# Patient Record
Sex: MALE | Race: WHITE | NOT HISPANIC OR LATINO | Employment: FULL TIME | URBAN - NONMETROPOLITAN AREA
[De-identification: names, ages, dates, MRNs, and addresses within clinical notes are randomized per-mention and may not be internally consistent; named-entity substitution may affect disease eponyms.]

---

## 2017-03-31 ENCOUNTER — TRANSCRIBE ORDERS (OUTPATIENT)
Dept: GENERAL RADIOLOGY | Facility: HOSPITAL | Age: 57
End: 2017-03-31

## 2017-03-31 ENCOUNTER — HOSPITAL ENCOUNTER (OUTPATIENT)
Dept: GENERAL RADIOLOGY | Facility: HOSPITAL | Age: 57
Discharge: HOME | End: 2017-03-31
Admitting: INTERNAL MEDICINE

## 2017-03-31 DIAGNOSIS — D03.59 MELANOMA IN SITU OF BACK (HCC): Primary | ICD-10-CM

## 2017-03-31 DIAGNOSIS — D03.59 MELANOMA IN SITU OF BACK (HCC): ICD-10-CM

## 2017-03-31 PROCEDURE — 71020 HC CHEST PA AND LATERAL: CPT

## 2018-01-08 ENCOUNTER — TRANSCRIBE ORDERS (OUTPATIENT)
Dept: PHYSICAL THERAPY | Facility: CLINIC | Age: 58
End: 2018-01-08

## 2018-01-08 DIAGNOSIS — M24.572 EQUINUS CONTRACTURE OF LEFT ANKLE: Primary | ICD-10-CM

## 2018-01-11 ENCOUNTER — TREATMENT (OUTPATIENT)
Dept: PHYSICAL THERAPY | Facility: CLINIC | Age: 58
End: 2018-01-11

## 2018-01-11 DIAGNOSIS — M25.572 ACUTE LEFT ANKLE PAIN: Primary | ICD-10-CM

## 2018-01-11 PROCEDURE — 97161 PT EVAL LOW COMPLEX 20 MIN: CPT | Performed by: PHYSICAL THERAPIST

## 2018-01-11 PROCEDURE — 97140 MANUAL THERAPY 1/> REGIONS: CPT | Performed by: PHYSICAL THERAPIST

## 2018-01-11 PROCEDURE — 97110 THERAPEUTIC EXERCISES: CPT | Performed by: PHYSICAL THERAPIST

## 2018-01-11 NOTE — PROGRESS NOTES
Physical Therapy Initial Evaluation and Plan of Care      Patient: Nic Napoles   : 1960  Diagnosis/ICD-10 Code:  No primary diagnosis found.  Referring practitioner: Aníbal Dempsey DPM    Subjective Evaluation    History of Present Illness  Mechanism of injury: L foot pain began on the top of the foot for several months.   He has noted his L foot slapping more when walking.  His flexibility also is limited.     Pain has subsided since getting Cortizone injection in the top of the foot.       Patient Occupation: work clerical work.  Pain  Current pain ratin  Location: Top of the L foot.   Quality: burning  Relieving factors: medications (injection)             Objective       Observations   Left Ankle/Foot   Positive for atrophy.     Palpation     Additional Palpation Details  Tendons of the L foot are tender and guarded.      Active Range of Motion   Left Ankle/Foot   Dorsiflexion (ke): 1 degrees     Right Ankle/Foot   Dorsiflexion (ke): 13 degrees     Additional Active Range of Motion Details  Toes are resting at a 35 degree position of extension.    Passive Range of Motion   Left Ankle/Foot    Dorsiflexion (ke): 10 degrees     Additional Passive Range of Motion Details  Toe flexion is moderately limited L greater than R LE.     Joint Play   Left Ankle/Foot  Hypomobile in the midfoot and forefoot.     Right Ankle/Foot  Hypomobile in the midfoot and forefoot.     Strength/Myotome Testing     Left Ankle/Foot   Dorsiflexion: 3- (ROM limitations as well as strength)         Assessment & Plan     Assessment  Impairments: abnormal gait, abnormal muscle firing, abnormal or restricted ROM, activity intolerance, impaired physical strength, lacks appropriate home exercise program, pain with function and weight-bearing intolerance  Assessment details: Patient is a 57 year old male who comes to physical therapy with L foot pain and foot drop. Signs and symptoms are consistent with L ankle tightness that  is limiting motion and creating weakness and foot drop.  The patient currently has pain, decreased ROM, decreased strength, and inability to perform all essential functional activities. Pt will benefit from skilled PT services to address the above issues.     Prognosis: good  Prognosis details: SHORT TERM GOALS:     2 weeks  1. Pt independent with HEP   2. Pt to demonstrate ability to ambulate in the clinic without walking boot and with no reports of increased pain  3. Pt to demonstrate ability to ambulate in the clinic without antalgic gait   3. Pt to demonstrate ability to perform single leg heel raise on the left without increase in pain      LONG TERM GOALS:   6 weeks  1. Pt to demonstrate ability to perform full functional squat with good form and no increase in pain in the left foot/ankle  2. Pt to demonstrate ability to ambulate on TM for 10 minutes with normal gait pattern without increase in pain  3. Pt to report being able to work full shift without increase in pain in the left ankle/foot  4. Pt to demonstrate ability to perform SLS on the left lower extremity for 30 seconds without LOB or increased pain     Functional Limitations: walking and unable to perform repetitive tasks  Plan  Therapy options: will be seen for skilled physical therapy services  Planned modality interventions: cryotherapy, contrast bath immersion and ultrasound  Planned therapy interventions: stretching, strengthening, soft tissue mobilization, therapeutic activities, joint mobilization, home exercise program, gait training, functional ROM exercises, flexibility, body mechanics training, balance/weight-bearing training, ADL retraining and manual therapy  Frequency: 2x week  Duration in weeks: 8  Treatment plan discussed with: patient        Manual Therapy:    10     mins  60279;  Therapeutic Exercise:    14     mins  85780;     Neuromuscular Natalie:        mins  65984;    Therapeutic Activity:          mins  23351;     Gait Training:            mins  38400;     Ultrasound:          mins  29114;    Electrical Stimulation:         mins  30837 ( );  Dry Needling          mins self-pay    Timed Treatment:   24   mins   Total Treatment:     56   mins    PT SIGNATURE: Adryan Garnett, PT   DATE TREATMENT INITIATED: 1/11/2018    Initial Certification  Certification Period: 4/11/2018  I certify that the therapy services are furnished while this patient is under my care.  The services outlined above are required by this patient, and will be reviewed every 90 days.     PHYSICIAN: Aníbal Dempsey, ARNOLD      DATE:     Please sign and return via fax to  .. Thank you, [facility].

## 2018-01-15 ENCOUNTER — TREATMENT (OUTPATIENT)
Dept: PHYSICAL THERAPY | Facility: CLINIC | Age: 58
End: 2018-01-15

## 2018-01-15 DIAGNOSIS — M25.572 ACUTE LEFT ANKLE PAIN: Primary | ICD-10-CM

## 2018-01-15 PROCEDURE — 97140 MANUAL THERAPY 1/> REGIONS: CPT | Performed by: PHYSICAL THERAPIST

## 2018-01-15 PROCEDURE — 97530 THERAPEUTIC ACTIVITIES: CPT | Performed by: PHYSICAL THERAPIST

## 2018-01-15 PROCEDURE — 97110 THERAPEUTIC EXERCISES: CPT | Performed by: PHYSICAL THERAPIST

## 2018-01-15 NOTE — PROGRESS NOTES
Physical Therapy Daily Progress Note      Visit # : 2    Nic Napoles reports 0/10 pain today at rest.  Occasional sharp pain noted in the foot. Pt reports he is feeling better.          Objective Pt present to PT today with less antalgia noted with ambulation.     Pt with less pain noted with PROM of the foot and toes.       See Exercise, Manual, and Modality Logs for complete treatment.     Assessment/Plan  Pt with increased function and less pain overall. His DF is still limited.       Progress per Plan of Care  Add more hip strengthening for weakness in his hips.  Check response to tape of the foot.            Manual Therapy:    12     mins  24036;  Therapeutic Exercise:    34     mins  53220;     Neuromuscular Natalie:        mins  75698;    Therapeutic Activity:     8     mins  46024;     Gait Training:        ___  mins  37830;     Ultrasound:          mins  91673;    Electrical Stimulation:         mins  99984 ( );  Dry Needling          mins self-pay    Timed Treatment: 54     mins   Total Treatment:     58   mins    Adryan Garnett, PT  Physical Therapist

## 2018-01-19 ENCOUNTER — TREATMENT (OUTPATIENT)
Dept: PHYSICAL THERAPY | Facility: CLINIC | Age: 58
End: 2018-01-19

## 2018-01-19 DIAGNOSIS — M25.572 ACUTE LEFT ANKLE PAIN: Primary | ICD-10-CM

## 2018-01-19 PROCEDURE — 97110 THERAPEUTIC EXERCISES: CPT | Performed by: PHYSICAL THERAPIST

## 2018-01-19 PROCEDURE — 97140 MANUAL THERAPY 1/> REGIONS: CPT | Performed by: PHYSICAL THERAPIST

## 2018-01-19 NOTE — PROGRESS NOTES
Physical Therapy Daily Progress Note      Visit # : 3    Nic Napoles reports 0/10 pain today at rest.  Pt reports some pain in the top of the foot periodically through the day.         Objective Pt present to PT today with no distress.  Pt with much better DF through gait cycle. He still has shortened gait cycle and limited mechanics.     Hip weakness on the R LE is limited gait cycle as well.       See Exercise, Manual, and Modality Logs for complete treatment.     Assessment/Plan  Pt has made great progress with foot ROM and strength.       Progress per Plan of Care             Manual Therapy:    24     mins  98473;  Therapeutic Exercise:    30     mins  65706;     Neuromuscular Natalie:        mins  54727;    Therapeutic Activity:          mins  23937;     Gait Training:        ___  mins  94900;     Ultrasound:          mins  40949;    Electrical Stimulation:         mins  11264 ( );  Dry Needling          mins self-pay    Timed Treatment:   54   mins   Total Treatment:     56   mins    Adryan Garnett, PT  Physical Therapist

## 2018-01-22 ENCOUNTER — TREATMENT (OUTPATIENT)
Dept: PHYSICAL THERAPY | Facility: CLINIC | Age: 58
End: 2018-01-22

## 2018-01-22 DIAGNOSIS — M25.572 ACUTE LEFT ANKLE PAIN: Primary | ICD-10-CM

## 2018-01-22 PROCEDURE — 97140 MANUAL THERAPY 1/> REGIONS: CPT | Performed by: PHYSICAL THERAPIST

## 2018-01-22 PROCEDURE — 97112 NEUROMUSCULAR REEDUCATION: CPT | Performed by: PHYSICAL THERAPIST

## 2018-01-22 PROCEDURE — 97110 THERAPEUTIC EXERCISES: CPT | Performed by: PHYSICAL THERAPIST

## 2018-01-22 NOTE — PROGRESS NOTES
Physical Therapy Daily Progress Note      Visit # : 4    Nic Napoles reports 0/10 pain today at rest.  Pt reports stiffness in the ankle and foot.          Objective Pt present to PT today with no distress noted.     L ankle DF is still limited in ROM and strength.        See Exercise, Manual, and Modality Logs for complete treatment.     Assessment/Plan  Pt with improved overall status.       Progress per Plan of Care             Manual Therapy:    14     mins  40277;  Therapeutic Exercise:    28     mins  65101;     Neuromuscular Natalie:    14    mins  64733;    Therapeutic Activity:          mins  64147;     Gait Training:        ___  mins  40535;     Ultrasound:          mins  09107;    Electrical Stimulation:         mins  41786 ( );  Dry Needling          mins self-pay    Timed Treatment:   56   mins   Total Treatment:     62   mins    Adryan Garnett, PT  Physical Therapist

## 2018-01-26 ENCOUNTER — TREATMENT (OUTPATIENT)
Dept: PHYSICAL THERAPY | Facility: CLINIC | Age: 58
End: 2018-01-26

## 2018-01-26 DIAGNOSIS — M25.572 ACUTE LEFT ANKLE PAIN: Primary | ICD-10-CM

## 2018-01-26 PROCEDURE — 97110 THERAPEUTIC EXERCISES: CPT | Performed by: PHYSICAL THERAPIST

## 2018-01-26 PROCEDURE — 97140 MANUAL THERAPY 1/> REGIONS: CPT | Performed by: PHYSICAL THERAPIST

## 2018-01-26 PROCEDURE — 97112 NEUROMUSCULAR REEDUCATION: CPT | Performed by: PHYSICAL THERAPIST

## 2018-01-26 NOTE — PROGRESS NOTES
Physical Therapy Daily Progress Note      Visit # : 5    Nic Napoles reports 0/10 pain today at rest.  Pt reports he feels like he is doing better with more ankle motion during gait.         Objective Pt present to PT today with no distress at rest.      R Ankle DF is still slightly limited in ROM and moderately limited in Strength.    ambulation without shoe on is more normal without foot slap noted.       See Exercise, Manual, and Modality Logs for complete treatment.     Assessment/Plan  Pt is gaining ROM and is pain free.  His strength in DF is still limited.       Progress strengthening /stabilization /functional activity             Manual Therapy:    14     mins  79556;  Therapeutic Exercise:    31     mins  02784;     Neuromuscular Natalie:  12      mins  77715;    Therapeutic Activity:          mins  66852;     Gait Training:        ___  mins  91284;     Ultrasound:          mins  54623;    Electrical Stimulation:         mins  52421 ( );  Dry Needling          mins self-pay    Timed Treatment:   57   mins   Total Treatment:     61   mins    Adryan Garnett, PT  Physical Therapist

## 2018-01-29 ENCOUNTER — TREATMENT (OUTPATIENT)
Dept: PHYSICAL THERAPY | Facility: CLINIC | Age: 58
End: 2018-01-29

## 2018-01-29 DIAGNOSIS — M25.572 ACUTE LEFT ANKLE PAIN: Primary | ICD-10-CM

## 2018-01-29 PROCEDURE — 97110 THERAPEUTIC EXERCISES: CPT | Performed by: PHYSICAL THERAPIST

## 2018-01-29 PROCEDURE — 97140 MANUAL THERAPY 1/> REGIONS: CPT | Performed by: PHYSICAL THERAPIST

## 2018-01-29 NOTE — PROGRESS NOTES
Physical Therapy Daily Progress Note      Visit # : 6    Nic Napoles reports 0/10 pain today at rest.  Pt with no pain.         Objective Pt present to PT today with no distress noted.    Pt with DF strength slightly elevated.        See Exercise, Manual, and Modality Logs for complete treatment.     Assessment/Plan  Pt with ankle DF strength improved.       Progress per Plan of Care             Manual Therapy:    12     mins  16000;  Therapeutic Exercise:    42     mins  47026;     Neuromuscular Natalie:        mins  46598;    Therapeutic Activity:          mins  33839;     Gait Training:        ___  mins  43582;     Ultrasound:          mins  69833;    Electrical Stimulation:         mins  79349 ( );  Dry Needling          mins self-pay    Timed Treatment:   54   mins   Total Treatment:     59   mins    Adryan Garnett, PT  Physical Therapist

## 2018-01-31 ENCOUNTER — TREATMENT (OUTPATIENT)
Dept: PHYSICAL THERAPY | Facility: CLINIC | Age: 58
End: 2018-01-31

## 2018-01-31 DIAGNOSIS — M25.572 ACUTE LEFT ANKLE PAIN: Primary | ICD-10-CM

## 2018-01-31 PROCEDURE — 97530 THERAPEUTIC ACTIVITIES: CPT | Performed by: PHYSICAL THERAPIST

## 2018-01-31 PROCEDURE — 97110 THERAPEUTIC EXERCISES: CPT | Performed by: PHYSICAL THERAPIST

## 2018-02-05 ENCOUNTER — TREATMENT (OUTPATIENT)
Dept: PHYSICAL THERAPY | Facility: CLINIC | Age: 58
End: 2018-02-05

## 2018-02-05 DIAGNOSIS — M25.572 ACUTE LEFT ANKLE PAIN: Primary | ICD-10-CM

## 2018-02-05 PROCEDURE — 97110 THERAPEUTIC EXERCISES: CPT | Performed by: PHYSICAL THERAPIST

## 2018-02-05 PROCEDURE — 97140 MANUAL THERAPY 1/> REGIONS: CPT | Performed by: PHYSICAL THERAPIST

## 2018-02-05 NOTE — PROGRESS NOTES
Physical Therapy Daily Progress Note      Visit # : 8    Nic Napoles reports 0/10 pain today at rest.  Pt reports he feels like he is getting better.         Objective Pt present to PT today with no distress noted.     Pt ambulating without any pain.      PROM is pain free in the ankle and foot.       See Exercise, Manual, and Modality Logs for complete treatment.     Assessment/Plan  Pt with no pain currently.  Pt is still developing strength in the ankle and the hip.        Progress per Plan of Care  See pt for 2 weeks then reassess.            Manual Therapy:    12     mins  52727;  Therapeutic Exercise:    42     mins  37637;     Neuromuscular Natalie:        mins  44853;    Therapeutic Activity:          mins  79582;     Gait Training:        ___  mins  99550;     Ultrasound:          mins  53318;    Electrical Stimulation:         mins  48069 ( );  Dry Needling          mins self-pay    Timed Treatment:   54   mins   Total Treatment:     59   mins    Adryan Garnett, PT  Physical Therapist

## 2018-02-09 ENCOUNTER — TREATMENT (OUTPATIENT)
Dept: PHYSICAL THERAPY | Facility: CLINIC | Age: 58
End: 2018-02-09

## 2018-02-09 DIAGNOSIS — M25.572 ACUTE LEFT ANKLE PAIN: Primary | ICD-10-CM

## 2018-02-09 PROCEDURE — 97140 MANUAL THERAPY 1/> REGIONS: CPT | Performed by: PHYSICAL THERAPIST

## 2018-02-09 PROCEDURE — 97110 THERAPEUTIC EXERCISES: CPT | Performed by: PHYSICAL THERAPIST

## 2018-02-09 NOTE — PROGRESS NOTES
Physical Therapy Daily Progress Note      Visit # : 9    Nic Napoles reports 0/10 pain today at rest.  Pt reports he is feeling stronger.  He has walked 2 nights this week.  He walked about 2 miles.  He still feels like his foot slaps with prolonged walking.         Objective Pt present to PT today with no distress noted.     Pt with less gait deficit noted.      See Exercise, Manual, and Modality Logs for complete treatment.     Assessment/Plan  Pt with slow progress with strength in the hips and ankle.  He is pain free in the foot.       Progress per Plan of Care               Manual Therapy:    16     mins  10954;  Therapeutic Exercise:    42     mins  56246;     Neuromuscular Natalie:        mins  07226;    Therapeutic Activity:          mins  87461;     Gait Training:        ___  mins  81030;     Ultrasound:          mins  75843;    Electrical Stimulation:         mins  57725 ( );  Dry Needling          mins self-pay    Timed Treatment:   58   mins   Total Treatment:     61   mins    Adryan Garnett, PT  Physical Therapist

## 2018-02-12 ENCOUNTER — TREATMENT (OUTPATIENT)
Dept: PHYSICAL THERAPY | Facility: CLINIC | Age: 58
End: 2018-02-12

## 2018-02-12 DIAGNOSIS — M25.572 ACUTE LEFT ANKLE PAIN: Primary | ICD-10-CM

## 2018-02-12 PROCEDURE — 97110 THERAPEUTIC EXERCISES: CPT | Performed by: PHYSICAL THERAPIST

## 2018-02-12 PROCEDURE — 97140 MANUAL THERAPY 1/> REGIONS: CPT | Performed by: PHYSICAL THERAPIST

## 2018-02-12 NOTE — PROGRESS NOTES
Physical Therapy Daily Progress Note      Visit # : 10    Nic Napoles reports 0/10 pain today at rest.  Pt reports stiffness in the foot.  No pain noted since last visit.         Objective Pt present to PT today with no distress noted.      Pt still needs VC's for correct posture and mechanics for HEP.     MMT:  L ankle DF 3/5,  Inversion 3-/5,  Eversion 4+/5      See Exercise, Manual, and Modality Logs for complete treatment.     Assessment/Plan  Pt's ankle strength is still limited.  He may benefit from nerve testing.       Progress per Plan of Care  Finalized HEP.  Refer back to MD for nerve testing.            Manual Therapy:    13     mins  89344;  Therapeutic Exercise:    42     mins  30338;     Neuromuscular Natalie:        mins  50316;    Therapeutic Activity:          mins  02296;     Gait Training:        ___  mins  79627;     Ultrasound:          mins  54992;    Electrical Stimulation:         mins  02690 ( );  Dry Needling          mins self-pay    Timed Treatment:   55   mins   Total Treatment:     63   mins    Adryan Garnett, PT  Physical Therapist

## 2018-02-16 ENCOUNTER — TREATMENT (OUTPATIENT)
Dept: PHYSICAL THERAPY | Facility: CLINIC | Age: 58
End: 2018-02-16

## 2018-02-16 DIAGNOSIS — M25.572 ACUTE LEFT ANKLE PAIN: Primary | ICD-10-CM

## 2018-02-16 PROCEDURE — 97110 THERAPEUTIC EXERCISES: CPT | Performed by: PHYSICAL THERAPIST

## 2018-02-16 PROCEDURE — 97140 MANUAL THERAPY 1/> REGIONS: CPT | Performed by: PHYSICAL THERAPIST

## 2018-02-16 NOTE — PROGRESS NOTES
Physical Therapy Daily Progress Note      Visit # : 11    Nic Napoles reports 0/10 pain today at rest.  Pt with no pain for 2 weeks.  He reports returning to walking and he is more focused on mechanics.         Objective Pt present to PT today with no distress.  Pt with better ambulation with less hip trendelenburg gait pattern.      Pt with L foot DF still limited in strength but it seems slightly stronger than last visit.       See Exercise, Manual, and Modality Logs for complete treatment.     Assessment/Plan  Pt with improved gait and improved ambulation.       Progress per Plan of Care  Re-check in 2 weeks for strength in L LE.            Manual Therapy:    10     mins  11710;  Therapeutic Exercise:    34     mins  52618;     Neuromuscular Natalie:        mins  97044;    Therapeutic Activity:          mins  47787;     Gait Training:        ___  mins  13069;     Ultrasound:          mins  32993;    Electrical Stimulation:         mins  91680 ( );  Dry Needling          mins self-pay    Timed Treatment:   44   mins   Total Treatment:     51   mins    Adryan Garnett, PT  Physical Therapist

## 2018-03-02 ENCOUNTER — TREATMENT (OUTPATIENT)
Dept: PHYSICAL THERAPY | Facility: CLINIC | Age: 58
End: 2018-03-02

## 2018-03-02 DIAGNOSIS — M25.572 ACUTE LEFT ANKLE PAIN: Primary | ICD-10-CM

## 2018-03-02 PROCEDURE — 97530 THERAPEUTIC ACTIVITIES: CPT | Performed by: PHYSICAL THERAPIST

## 2018-03-02 PROCEDURE — 97140 MANUAL THERAPY 1/> REGIONS: CPT | Performed by: PHYSICAL THERAPIST

## 2018-03-02 PROCEDURE — 97110 THERAPEUTIC EXERCISES: CPT | Performed by: PHYSICAL THERAPIST

## 2018-03-02 NOTE — PROGRESS NOTES
Physical Therapy Daily Progress Note      Visit # : 12    Nic Napoles reports 4/10 pain today at rest.  Pt reports he has had more pian just this week.  No pain the week before.         Objective Pt present to PT today with minimal foot slap noted with ambulation with boots on.     AROM:  DF on the L 4degrees,  PROM DF of the L LE 9 degrees    Hip abd on the L LE 3-/5,  DF 2+/5    DTR's are equal and symmetric.        See Exercise, Manual, and Modality Logs for complete treatment.     Assessment/Plan  Pt with some improvement with the PROM and function but his AROM and strength of the DF have not improved that much.       Pt may return to PT after further work up from MD about the foot drop.              Manual Therapy:    9     mins  41096;  Therapeutic Exercise:    34     mins  25866;     Neuromuscular Natalie:        mins  04886;    Therapeutic Activity:     12     mins  31822;     Gait Training:        ___  mins  03732;     Ultrasound:          mins  85045;    Electrical Stimulation:         mins  43556 ( );  Dry Needling          mins self-pay    Timed Treatment:  55    mins   Total Treatment:     58   mins    Adryan Garnett, PT  Physical Therapist

## 2018-04-03 ENCOUNTER — HOSPITAL ENCOUNTER (OUTPATIENT)
Dept: GENERAL RADIOLOGY | Facility: HOSPITAL | Age: 58
Discharge: HOME | End: 2018-04-03
Attending: INTERNAL MEDICINE | Admitting: INTERNAL MEDICINE

## 2018-04-03 ENCOUNTER — TRANSCRIBE ORDERS (OUTPATIENT)
Dept: GENERAL RADIOLOGY | Facility: HOSPITAL | Age: 58
End: 2018-04-03

## 2018-04-03 DIAGNOSIS — R52 PAIN: ICD-10-CM

## 2018-04-03 DIAGNOSIS — R52 PAIN: Primary | ICD-10-CM

## 2018-04-03 PROCEDURE — 73630 X-RAY EXAM OF FOOT: CPT

## 2019-10-14 ENCOUNTER — TRANSCRIBE ORDERS (OUTPATIENT)
Dept: GENERAL RADIOLOGY | Facility: HOSPITAL | Age: 59
End: 2019-10-14

## 2019-10-14 ENCOUNTER — HOSPITAL ENCOUNTER (OUTPATIENT)
Dept: GENERAL RADIOLOGY | Facility: HOSPITAL | Age: 59
Discharge: HOME | End: 2019-10-14
Admitting: INTERNAL MEDICINE

## 2019-10-14 DIAGNOSIS — M79.672 LEFT FOOT PAIN: ICD-10-CM

## 2019-10-14 DIAGNOSIS — M79.672 LEFT FOOT PAIN: Primary | ICD-10-CM

## 2019-10-14 PROCEDURE — 73630 X-RAY EXAM OF FOOT: CPT

## 2019-11-04 ENCOUNTER — OFFICE VISIT (OUTPATIENT)
Dept: UROLOGY | Facility: CLINIC | Age: 59
End: 2019-11-04

## 2019-11-04 VITALS
HEART RATE: 64 BPM | TEMPERATURE: 98.2 F | DIASTOLIC BLOOD PRESSURE: 82 MMHG | RESPIRATION RATE: 16 BRPM | OXYGEN SATURATION: 98 % | SYSTOLIC BLOOD PRESSURE: 136 MMHG

## 2019-11-04 DIAGNOSIS — N32.81 OAB (OVERACTIVE BLADDER): Primary | ICD-10-CM

## 2019-11-04 DIAGNOSIS — N40.0 BENIGN PROSTATIC HYPERPLASIA, UNSPECIFIED WHETHER LOWER URINARY TRACT SYMPTOMS PRESENT: Primary | ICD-10-CM

## 2019-11-04 DIAGNOSIS — R35.1 NOCTURIA: ICD-10-CM

## 2019-11-04 DIAGNOSIS — N32.81 OAB (OVERACTIVE BLADDER): ICD-10-CM

## 2019-11-04 LAB
BILIRUB BLD-MCNC: NEGATIVE MG/DL
CLARITY, POC: CLEAR
COLOR UR: YELLOW
GLUCOSE UR STRIP-MCNC: NEGATIVE MG/DL
KETONES UR QL: NEGATIVE
LEUKOCYTE EST, POC: NEGATIVE
NITRITE UR-MCNC: NEGATIVE MG/ML
PH UR: 6.5 [PH] (ref 5–8)
PROT UR STRIP-MCNC: NEGATIVE MG/DL
RBC # UR STRIP: NEGATIVE /UL
SP GR UR: 1.01 (ref 1–1.03)
UROBILINOGEN UR QL: NORMAL

## 2019-11-04 PROCEDURE — 76857 US EXAM PELVIC LIMITED: CPT | Performed by: UROLOGY

## 2019-11-04 PROCEDURE — 99203 OFFICE O/P NEW LOW 30 MIN: CPT | Performed by: UROLOGY

## 2019-11-04 PROCEDURE — 81003 URINALYSIS AUTO W/O SCOPE: CPT | Performed by: UROLOGY

## 2019-11-04 RX ORDER — TAMSULOSIN HYDROCHLORIDE 0.4 MG/1
CAPSULE ORAL
Refills: 2 | COMMUNITY
Start: 2019-10-24 | End: 2020-07-27

## 2019-11-04 RX ORDER — OXYBUTYNIN CHLORIDE 5 MG/1
5 TABLET ORAL NIGHTLY
Qty: 30 TABLET | Refills: 5 | Status: SHIPPED | OUTPATIENT
Start: 2019-11-04

## 2019-11-04 RX ORDER — DICLOFENAC SODIUM 75 MG/1
TABLET, DELAYED RELEASE ORAL
Refills: 0 | COMMUNITY
Start: 2019-10-14

## 2019-11-04 NOTE — PROGRESS NOTES
Chief Complaint  Benign Prostatic Hypertrophy; OAB; and Urinary Frequency        MATEO Napoles is a 59 y.o. male who is referred for evaluation of the above but with a chief concern of nocturia x5 last night.  He denies any significant difficulty voiding during the day although he sometimes feels the urge to void without being able to.  He states this is been going on for more than 1 year and 6 months ago he was started on Flomax but has seen no difference in his voiding.  He and his wife do not sleep together because they both snore so bad.  He also is thought to have restless leg syndrome which disturbs his sleep.  He admits to drinking large volumes of caffeinated beverages including 2 large mugs of coffee every morning and several Mountain Dew's in the afternoon.  He is observed to have lower extremity edema today although denies being a big salt eater.  He states he is been obese since a child and constantly fights his weight.  He tries to drink skim milk but admits to eating a lot of fried food and red meat.    Vitals:    11/04/19 1545   BP: 136/82   Pulse: 64   Resp: 16   Temp: 98.2 °F (36.8 °C)   SpO2: 98%       Past Medical History  Past Medical History:   Diagnosis Date   • Allergic        Past Surgical History  Past Surgical History:   Procedure Laterality Date   • HEEL SPUR SURGERY         Medications  has a current medication list which includes the following prescription(s): diclofenac and tamsulosin.      Allergies  No Known Allergies    Social History  Social History     Socioeconomic History   • Marital status:      Spouse name: Not on file   • Number of children: Not on file   • Years of education: Not on file   • Highest education level: Not on file   Tobacco Use   • Smoking status: Never Smoker   • Smokeless tobacco: Never Used   Substance and Sexual Activity   • Alcohol use: No   • Drug use: No   • Sexual activity: Defer       Family History  He has no family history of bladder or kidney  cancer  He has no family history of kidney stones      AUA Symptom Score:    22  Review of Systems  Review of Systems   Constitutional: Negative for activity change, appetite change, chills, fatigue, fever, unexpected weight gain and unexpected weight loss.   Respiratory: Negative for apnea, cough, chest tightness, shortness of breath, wheezing and stridor.    Cardiovascular: Negative for chest pain, palpitations and leg swelling.   Gastrointestinal: Negative for abdominal distention, abdominal pain, anal bleeding, blood in stool, constipation, diarrhea, nausea, rectal pain, vomiting, GERD and indigestion.   Genitourinary: Positive for frequency, nocturia and urgency. Negative for decreased libido, decreased urine volume, difficulty urinating, discharge, dysuria, flank pain, genital sores, hematuria, penile pain, erectile dysfunction, penile swelling, scrotal swelling, testicular pain and urinary incontinence.   Musculoskeletal: Negative for back pain and joint swelling.   Neurological: Negative for tremors, seizures, speech difficulty, weakness and numbness.   Psychiatric/Behavioral: Negative for agitation, decreased concentration, sleep disturbance, depressed mood and stress. The patient is not nervous/anxious.      Positive for painful joints frequent urination but negative in other categories.  Physical Exam  Physical Exam   Constitutional: He is oriented to person, place, and time. He appears well-developed and well-nourished.   HENT:   Head: Normocephalic and atraumatic.   Neck: Normal range of motion.   Pulmonary/Chest: Effort normal. No respiratory distress.   Abdominal: Soft. He exhibits no distension and no mass. There is no tenderness. No hernia.   Genitourinary: Rectum normal, prostate normal and penis normal.   Musculoskeletal: Normal range of motion. He exhibits edema.   Lymphadenopathy:     He has no cervical adenopathy.   Neurological: He is alert and oriented to person, place, and time.   Skin: Skin  is warm and dry.   Psychiatric: He has a normal mood and affect. His behavior is normal.   Vitals reviewed.      Labs Recent and today in the office:  Results for orders placed or performed in visit on 11/04/19   POC Urinalysis Dipstick, Automated   Result Value Ref Range    Color Yellow Yellow, Straw, Dark Yellow, Mamta    Clarity, UA Clear Clear    Specific Gravity  1.015 1.005 - 1.030    pH, Urine 6.5 5.0 - 8.0    Leukocytes Negative Negative    Nitrite, UA Negative Negative    Protein, POC Negative Negative mg/dL    Glucose, UA Negative Negative, 1000 mg/dL (3+) mg/dL    Ketones, UA Negative Negative    Urobilinogen, UA Normal Normal    Bilirubin Negative Negative    Blood, UA Negative Negative         Assessment & Plan  BPH with outlet obstruction: He has mild symptoms of overactive bladder with frequency and feeling the urge to void without needing to but he is emptying the bladder fairly well with less than 1 ounce of postvoid residual and no response to Flomax.  His digital rectal exam today is perfectly benign revealing a small nonsuspicious prostate.  His prostate is only slightly enlarged at 37.6 g.    Nocturia: This is definitely multifactorial and I suspect could be related to obstructive sleep apnea since he and his wife can sleep with each other for the snoring from both of them.  He is informed about the possibility of hypoxic episodes and the need to have this treated along with the fact that is a common cause of nocturia.  He is scheduled for evaluation and will return in 6 weeks after he is had a chance to visit the sleep clinic.    Overactive bladder: He could have interstitial cystitis but has no blood in the urine today.  I suspect the majority of his problem is the huge dose of caffeine that he is drinking and he will need to cut back on this to have any improvement in his frequency urgency and nocturia.  He is placed on small dose Ditropan at night and informed about Noctiva and DDAVP.

## 2019-11-04 NOTE — PROGRESS NOTES
[facility]- UROLOGY  [address], [address]  [place], [place] [address]  [phone]      11/04/2019    Nic Napoles  1960        Pelvic Ultrasound with PVR    A transabdominal pelvic ultrasound was performed using a 3.5 MHz transducer of a B-K Jones through the suprapubic area.     The purpose of the study was to investigate nocturia.  There was minimal bladder wall thickening noted.  There were no intravesical filling defects seen.  The post void residual of 24.4 ml was noted.  Prostate was homogeneous and was noted to be 37.6 grams.  There was a protrusion of the prostate into the bladder.  Ultrasound images will be scanned into the chart for reference.       CPT code 85465        Performed by Demond Portillo MD

## 2019-12-16 ENCOUNTER — OFFICE VISIT (OUTPATIENT)
Dept: UROLOGY | Facility: CLINIC | Age: 59
End: 2019-12-16

## 2019-12-16 DIAGNOSIS — N32.81 OAB (OVERACTIVE BLADDER): ICD-10-CM

## 2019-12-16 DIAGNOSIS — N40.1 BPH WITH URINARY OBSTRUCTION: Primary | ICD-10-CM

## 2019-12-16 DIAGNOSIS — N13.8 BPH WITH URINARY OBSTRUCTION: Primary | ICD-10-CM

## 2019-12-16 DIAGNOSIS — R35.1 NOCTURIA: ICD-10-CM

## 2019-12-16 LAB
BILIRUB BLD-MCNC: NEGATIVE MG/DL
CLARITY, POC: CLEAR
COLOR UR: YELLOW
GLUCOSE UR STRIP-MCNC: NEGATIVE MG/DL
KETONES UR QL: NEGATIVE
LEUKOCYTE EST, POC: NEGATIVE
NITRITE UR-MCNC: NEGATIVE MG/ML
PH UR: 6 [PH] (ref 5–8)
PROT UR STRIP-MCNC: NEGATIVE MG/DL
RBC # UR STRIP: NEGATIVE /UL
SP GR UR: 1.01 (ref 1–1.03)
UROBILINOGEN UR QL: NORMAL

## 2019-12-16 PROCEDURE — 99213 OFFICE O/P EST LOW 20 MIN: CPT | Performed by: UROLOGY

## 2019-12-16 PROCEDURE — 81003 URINALYSIS AUTO W/O SCOPE: CPT | Performed by: UROLOGY

## 2019-12-16 NOTE — PROGRESS NOTES
Chief Complaint  Benign Prostatic Hypertrophy; Nocturia; and OAB        MATEO Napoles is a 59 y.o. male who returns today for follow-up with a chief complaint of nocturia.  He had no response to Flomax and was noted to be emptying the bladder well with no significant postvoid residual associated with a small prostate.  Anxious to have him evaluated at the sleep clinic since he has a strong history of snoring and this could contribute to his nocturia.  He also has some lower extremity edema that is observed again today.  He had a dramatic intake of caffeine that he is gotten off of completely and his nocturia at this point is down from 5-3.    There were no vitals filed for this visit.    Past Medical History  Past Medical History:   Diagnosis Date   • Allergic        Past Surgical History  Past Surgical History:   Procedure Laterality Date   • HEEL SPUR SURGERY         Medications  has a current medication list which includes the following prescription(s): diclofenac, oxybutynin, and tamsulosin.      Allergies  No Known Allergies    Social History  Social History     Socioeconomic History   • Marital status:      Spouse name: Not on file   • Number of children: Not on file   • Years of education: Not on file   • Highest education level: Not on file   Tobacco Use   • Smoking status: Never Smoker   • Smokeless tobacco: Never Used   Substance and Sexual Activity   • Alcohol use: No   • Drug use: No   • Sexual activity: Defer       Family History  He has no family history of bladder or kidney cancer  He has no family history of kidney stones      AUA Symptom Score:      Review of Systems  Review of Systems   Genitourinary: Positive for difficulty urinating, frequency and urgency.       Physical Exam  Physical Exam   Constitutional: He is oriented to person, place, and time. He appears well-developed and well-nourished.   HENT:   Head: Normocephalic and atraumatic.   Neck: Normal range of motion.   Pulmonary/Chest:  Effort normal. No respiratory distress.   Abdominal: Soft. He exhibits no distension and no mass. There is no tenderness. No hernia.   Musculoskeletal: Normal range of motion.   Lymphadenopathy:     He has no cervical adenopathy.   Neurological: He is alert and oriented to person, place, and time.   Skin: Skin is warm and dry.   Psychiatric: He has a normal mood and affect. His behavior is normal.   Vitals reviewed.      Labs Recent and today in the office:  Results for orders placed or performed in visit on 12/16/19   POC Urinalysis Dipstick, Automated   Result Value Ref Range    Color Yellow Yellow, Straw, Dark Yellow, Mamta    Clarity, UA Clear Clear    Specific Gravity  1.010 1.005 - 1.030    pH, Urine 6.0 5.0 - 8.0    Leukocytes Negative Negative    Nitrite, UA Negative Negative    Protein, POC Negative Negative mg/dL    Glucose, UA Negative Negative, 1000 mg/dL (3+) mg/dL    Ketones, UA Negative Negative    Urobilinogen, UA Normal Normal    Bilirubin Negative Negative    Blood, UA Negative Negative         Assessment & Plan  Overactive bladder/nocturia: The patient started on Myrbetriq since he is now off of caffeine and can take this in addition to his low-dose Ditropan.  He still has an appointment coming up for evaluation of obstructive sleep apnea and then will return in 1 month for progress report.

## 2019-12-19 ENCOUNTER — OFFICE VISIT (OUTPATIENT)
Dept: PULMONOLOGY | Facility: CLINIC | Age: 59
End: 2019-12-19

## 2019-12-19 VITALS
HEART RATE: 70 BPM | HEIGHT: 76 IN | OXYGEN SATURATION: 97 % | RESPIRATION RATE: 16 BRPM | SYSTOLIC BLOOD PRESSURE: 118 MMHG | BODY MASS INDEX: 33.36 KG/M2 | WEIGHT: 274 LBS | DIASTOLIC BLOOD PRESSURE: 78 MMHG

## 2019-12-19 DIAGNOSIS — G25.81 RESTLESS LEG SYNDROME: Primary | ICD-10-CM

## 2019-12-19 DIAGNOSIS — E66.9 OBESITY (BMI 30-39.9): ICD-10-CM

## 2019-12-19 DIAGNOSIS — R06.83 SNORING: ICD-10-CM

## 2019-12-19 PROCEDURE — 99244 OFF/OP CNSLTJ NEW/EST MOD 40: CPT | Performed by: INTERNAL MEDICINE

## 2019-12-19 RX ORDER — ROPINIROLE 1 MG/1
1 TABLET, FILM COATED ORAL NIGHTLY
Qty: 30 TABLET | Refills: 5 | Status: SHIPPED | OUTPATIENT
Start: 2019-12-19 | End: 2020-06-22

## 2019-12-19 NOTE — PROGRESS NOTES
"CONSULT NOTE    Requested by:   Demond Portillo MD Shah, Gaurang B, MD      Chief Complaint   Patient presents with   • Consult   • Sleeping Problem       Subjective:  Nic Napoles is a 59 y.o. male.     History of Present Illness   Patient comes in today for consultation because of occasional mild to moderate snoring.  The patient says that his main issue is that he wakes up multiple times at night to urinate.    He thought that it was secondary to caffeine intake and he has cut back significantly and now does not drink coffee or caffeinated drinks anymore.      He generally denies any tiredness or fatigue.  He also denies any headaches in the morning.  He denies any significant family history of sleep apnea.    The patient's family member, accompanying him today, does not mention episodes of pauses in his sleep etc.    Patient also complains of an urge to move his legs along with occasional tingling sensation. Patient also reports occasions when he gets \"cramps\" and has to rub them off and sometimes walk them off.      The following portions of the patient's history were reviewed and updated as appropriate: allergies, current medications, past family history, past medical history, past social history and past surgical history.    Review of Systems   Constitutional: Negative for chills, fatigue and fever.   HENT: Negative for sinus pressure, sneezing and sore throat.    Respiratory: Negative for cough, chest tightness, shortness of breath and wheezing.    Cardiovascular: Positive for leg swelling. Negative for palpitations.   Psychiatric/Behavioral: Positive for sleep disturbance.   All other systems reviewed and are negative.      Past Medical History:   Diagnosis Date   • Allergic        Social History     Tobacco Use   • Smoking status: Never Smoker   • Smokeless tobacco: Never Used   Substance Use Topics   • Alcohol use: No         Objective:  Visit Vitals  /78   Pulse 70   Resp 16   Ht 193 cm " "(76\")   Wt 124 kg (274 lb)   SpO2 97%   BMI 33.35 kg/m²       Physical Exam   Constitutional: He is oriented to person, place, and time. He appears well-developed and well-nourished.   HENT:   Head: Atraumatic.   Eyes: EOM are normal.   Neck: Neck supple. No JVD present. No thyromegaly present.   Cardiovascular: Normal rate and regular rhythm.   No murmur heard.  Pulmonary/Chest: Effort normal. No respiratory distress. He has no wheezes. He has no rales.   Musculoskeletal:   Gait was normal.    Neurological: He is alert and oriented to person, place, and time.   Skin: Skin is warm and dry.   Psychiatric: He has a normal mood and affect. His behavior is normal.   Vitals reviewed.        Assessment/Plan:  Nic was seen today for consult and sleeping problem.    Diagnoses and all orders for this visit:    Restless leg syndrome    Snoring    Obesity (BMI 30-39.9)    Other orders  -     rOPINIRole (REQUIP) 1 MG tablet; Take 1 tablet by mouth Every Night. Take 1 hour before bedtime.        Return in about 16 weeks (around 4/9/2020) for Recheck, Overbook.    DISCUSSION(if any):  We will try to obtain patient's last CBC in order to exclude possible iron deficiency causing anemia, as a potential contributor to symptoms of RLS     We will start the patient on Requip and adjust the dose according to symptomatic relief.    he was informed about the side effects in great detail.    There are no ongoing symptoms to suggest DANIEL.    His Priddy score was 0/24.    Will follow up in a few weeks to see if he has responded to Requip.       Dictated utilizing Dragon dictation.    This document was electronically signed by Prabhjot Bean MD on 12/19/19 at 10:21 AM    "

## 2020-01-20 ENCOUNTER — OFFICE VISIT (OUTPATIENT)
Dept: UROLOGY | Facility: CLINIC | Age: 60
End: 2020-01-20

## 2020-01-20 VITALS
HEART RATE: 86 BPM | OXYGEN SATURATION: 99 % | TEMPERATURE: 98.7 F | DIASTOLIC BLOOD PRESSURE: 79 MMHG | RESPIRATION RATE: 16 BRPM | SYSTOLIC BLOOD PRESSURE: 133 MMHG

## 2020-01-20 DIAGNOSIS — R35.1 NOCTURIA: ICD-10-CM

## 2020-01-20 DIAGNOSIS — R60.0 LOCALIZED EDEMA: ICD-10-CM

## 2020-01-20 DIAGNOSIS — N32.81 OAB (OVERACTIVE BLADDER): Primary | ICD-10-CM

## 2020-01-20 LAB
BILIRUB BLD-MCNC: NEGATIVE MG/DL
CLARITY, POC: CLEAR
COLOR UR: YELLOW
GLUCOSE UR STRIP-MCNC: NEGATIVE MG/DL
KETONES UR QL: NEGATIVE
LEUKOCYTE EST, POC: NEGATIVE
NITRITE UR-MCNC: NEGATIVE MG/ML
PH UR: 7.5 [PH] (ref 5–8)
PROT UR STRIP-MCNC: NEGATIVE MG/DL
RBC # UR STRIP: NEGATIVE /UL
SP GR UR: 1.01 (ref 1–1.03)
UROBILINOGEN UR QL: NORMAL

## 2020-01-20 PROCEDURE — 81003 URINALYSIS AUTO W/O SCOPE: CPT | Performed by: UROLOGY

## 2020-01-20 PROCEDURE — 99214 OFFICE O/P EST MOD 30 MIN: CPT | Performed by: UROLOGY

## 2020-01-20 RX ORDER — TRIAMTERENE AND HYDROCHLOROTHIAZIDE 37.5; 25 MG/1; MG/1
1 CAPSULE ORAL DAILY
Qty: 30 CAPSULE | Refills: 11 | Status: SHIPPED | OUTPATIENT
Start: 2020-01-20 | End: 2021-01-19

## 2020-01-20 NOTE — PROGRESS NOTES
Chief Complaint  Nocturia and OAB        MATEO Napoles is a 59 y.o. male who returns today for follow-up primarily concerned about nocturia.  Presentation he was having nocturia x5.  Identified multiple factors that were contributing to this besides urological once.  He had noticed no benefit from Flomax and was emptying the bladder well during the day so he discontinued this.  He was drinking large amounts of caffeine so I recommended caffeine reduction and after this was accomplished a trial of Myrbetriq.  He returns today stating he saw no benefit the Myrbetriq but his nocturia decreased from 5-3.  He describes some disturbing sleep patterns and I recommended evaluation by Dr. Bean.  He felt like there was no evidence for obstructive sleep apnea but started him on Requip for restless legs syndrome.  His nocturia is now down to 2.  An additional non-urological contributing factor was identified as lower extremity edema.  Once again he is observed to have 2+ edema today at 4:30 in the evening.  He admits to eating out a lot which probably has more salt intake than he needs.    Vitals:    01/20/20 1607   BP: 133/79   Pulse: 86   Resp: 16   Temp: 98.7 °F (37.1 °C)   SpO2: 99%       Past Medical History  Past Medical History:   Diagnosis Date   • Allergic        Past Surgical History  Past Surgical History:   Procedure Laterality Date   • HEEL SPUR SURGERY         Medications  has a current medication list which includes the following prescription(s): diclofenac, mirabegron er, oxybutynin, ropinirole, and tamsulosin.      Allergies  No Known Allergies    Social History  Social History     Socioeconomic History   • Marital status:      Spouse name: Not on file   • Number of children: Not on file   • Years of education: Not on file   • Highest education level: Not on file   Tobacco Use   • Smoking status: Never Smoker   • Smokeless tobacco: Never Used   Substance and Sexual Activity   • Alcohol use: No   • Drug  use: No   • Sexual activity: Defer       Family History  He has no family history of bladder or kidney cancer  He has no family history of kidney stones      AUA Symptom Score:      Review of Systems  Review of Systems    Physical Exam  Physical Exam   Constitutional: He is oriented to person, place, and time. He appears well-developed and well-nourished.   HENT:   Head: Normocephalic and atraumatic.   Neck: Normal range of motion.   Pulmonary/Chest: Effort normal. No respiratory distress.   Abdominal: He exhibits no distension and no mass. There is no tenderness. No hernia.   Musculoskeletal: Normal range of motion. He exhibits edema.   Lymphadenopathy:     He has no cervical adenopathy.   Neurological: He is alert and oriented to person, place, and time.   Skin: Skin is warm and dry.   Psychiatric: He has a normal mood and affect. His behavior is normal.   Vitals reviewed.      Labs Recent and today in the office:  Results for orders placed or performed in visit on 01/20/20   POC Urinalysis Dipstick, Automated   Result Value Ref Range    Color Yellow Yellow, Straw, Dark Yellow, Mamta    Clarity, UA Clear Clear    Specific Gravity  1.015 1.005 - 1.030    pH, Urine 7.5 5.0 - 8.0    Leukocytes Negative Negative    Nitrite, UA Negative Negative    Protein, POC Negative Negative mg/dL    Glucose, UA Negative Negative, 1000 mg/dL (3+) mg/dL    Ketones, UA Negative Negative    Urobilinogen, UA Normal Normal    Bilirubin Negative Negative    Blood, UA Negative Negative         Assessment & Plan  Nocturia: Greater than 25 minutes is spent counseling the patient on the numerous conditions he has that contribute to his nocturia.  The biggest remaining contributor seems to be lower extremity edema which is even present today.  I therefore recommended restricting his salt intake keeping his feet propped up in the evenings avoid late night fluid ingestion discontinue Flomax and Myrbetriq continue his Requip and take a diuretic  at 2:00 in the afternoons to decrease his lower extremity edema.  He is encouraged to eat a potassium rich food source on a daily basis return in 1 month for follow-up.

## 2020-02-19 ENCOUNTER — OFFICE VISIT (OUTPATIENT)
Dept: UROLOGY | Facility: CLINIC | Age: 60
End: 2020-02-19

## 2020-02-19 VITALS — BODY MASS INDEX: 33.36 KG/M2 | WEIGHT: 274 LBS | HEIGHT: 76 IN

## 2020-02-19 DIAGNOSIS — G25.81 RESTLESS LEG SYNDROME: ICD-10-CM

## 2020-02-19 DIAGNOSIS — R35.1 NOCTURIA: Primary | ICD-10-CM

## 2020-02-19 DIAGNOSIS — R60.0 LOCALIZED EDEMA: ICD-10-CM

## 2020-02-19 LAB
BILIRUB BLD-MCNC: NEGATIVE MG/DL
CLARITY, POC: CLEAR
COLOR UR: YELLOW
GLUCOSE UR STRIP-MCNC: NEGATIVE MG/DL
KETONES UR QL: NEGATIVE
LEUKOCYTE EST, POC: NEGATIVE
NITRITE UR-MCNC: NEGATIVE MG/ML
PH UR: 6.5 [PH] (ref 5–8)
PROT UR STRIP-MCNC: NEGATIVE MG/DL
RBC # UR STRIP: NEGATIVE /UL
SP GR UR: 1.02 (ref 1–1.03)
UROBILINOGEN UR QL: NORMAL

## 2020-02-19 PROCEDURE — 81003 URINALYSIS AUTO W/O SCOPE: CPT | Performed by: UROLOGY

## 2020-02-19 PROCEDURE — 99213 OFFICE O/P EST LOW 20 MIN: CPT | Performed by: UROLOGY

## 2020-06-22 RX ORDER — ROPINIROLE 1 MG/1
TABLET, FILM COATED ORAL
Qty: 30 TABLET | Refills: 1 | Status: SHIPPED | OUTPATIENT
Start: 2020-06-22 | End: 2020-08-19 | Stop reason: SDUPTHER

## 2020-07-27 PROCEDURE — U0004 COV-19 TEST NON-CDC HGH THRU: HCPCS | Performed by: NURSE PRACTITIONER

## 2020-07-27 PROCEDURE — U0002 COVID-19 LAB TEST NON-CDC: HCPCS | Performed by: NURSE PRACTITIONER

## 2020-07-28 ENCOUNTER — OFFICE VISIT (OUTPATIENT)
Dept: UROLOGY | Facility: CLINIC | Age: 60
End: 2020-07-28

## 2020-07-28 VITALS
OXYGEN SATURATION: 99 % | DIASTOLIC BLOOD PRESSURE: 79 MMHG | RESPIRATION RATE: 16 BRPM | HEART RATE: 86 BPM | TEMPERATURE: 99.2 F | SYSTOLIC BLOOD PRESSURE: 132 MMHG

## 2020-07-28 DIAGNOSIS — R35.1 NOCTURIA: Primary | ICD-10-CM

## 2020-07-28 DIAGNOSIS — N32.81 OAB (OVERACTIVE BLADDER): ICD-10-CM

## 2020-07-28 PROCEDURE — 99214 OFFICE O/P EST MOD 30 MIN: CPT | Performed by: UROLOGY

## 2020-07-28 RX ORDER — OXYBUTYNIN CHLORIDE 5 MG/1
5 TABLET ORAL NIGHTLY
Qty: 30 TABLET | Refills: 5 | Status: SHIPPED | OUTPATIENT
Start: 2020-07-28

## 2020-07-28 NOTE — PROGRESS NOTES
Chief Complaint  discuss medication        HPI  Dony is a 59 y.o. male who turns today for follow-up with the consistent complaint of nocturia sometimes 4 or 5 times per night.  He tells me today he voids very small amounts when he gets up and accumulative Ney only a few ounces through the night.  This does not sound like nocturnal polyuria.  He has failed to respond to Flomax noted little benefit from Myrbetriq and seemed to have an excellent response to Dyazide provided for his lower extremity edema.  This weekend however he states he had nocturia x4-5 again associated with severe muscle cramps and muscle spasms.  He is concerned about his electrolytes being abnormal.  He stopped taking the pill 48 hours ago and is already feeling better.  I suggested we check his magnesium and electrolytes however and discontinue the Dyazide.    Vitals:    07/28/20 1416   BP: 132/79   Pulse: 86   Resp: 16   Temp: 99.2 °F (37.3 °C)   SpO2: 99%       Past Medical History  Past Medical History:   Diagnosis Date   • Allergic        Past Surgical History  Past Surgical History:   Procedure Laterality Date   • HEEL SPUR SURGERY         Medications  has a current medication list which includes the following prescription(s): diclofenac, ropinirole, triamterene-hydrochlorothiazide, and oxybutynin.      Allergies  No Known Allergies    Social History  Social History     Socioeconomic History   • Marital status:      Spouse name: Not on file   • Number of children: Not on file   • Years of education: Not on file   • Highest education level: Not on file   Tobacco Use   • Smoking status: Never Smoker   • Smokeless tobacco: Never Used   Substance and Sexual Activity   • Alcohol use: No   • Drug use: No   • Sexual activity: Defer       Family History  He has no family history of bladder or kidney cancer  He has no family history of kidney stones      AUA Symptom Score:      Review of Systems  Review of Systems   Constitutional: Negative  for activity change, appetite change, chills, fatigue, fever, unexpected weight gain and unexpected weight loss.   Respiratory: Negative for apnea, cough, chest tightness, shortness of breath, wheezing and stridor.    Cardiovascular: Negative for chest pain, palpitations and leg swelling.   Gastrointestinal: Negative for abdominal distention, abdominal pain, anal bleeding, blood in stool, constipation, diarrhea, nausea, rectal pain, vomiting, GERD and indigestion.   Genitourinary: Positive for frequency, nocturia and urgency. Negative for decreased libido, decreased urine volume, difficulty urinating, discharge, dysuria, flank pain, genital sores, hematuria, penile pain, erectile dysfunction, penile swelling, scrotal swelling, testicular pain and urinary incontinence.   Musculoskeletal: Negative for back pain and joint swelling.   Neurological: Negative for tremors, seizures, speech difficulty, weakness and numbness.   Psychiatric/Behavioral: Negative for agitation, decreased concentration, sleep disturbance, depressed mood and stress. The patient is not nervous/anxious.        Physical Exam  Physical Exam   Constitutional: He is oriented to person, place, and time. He appears well-developed and well-nourished.   HENT:   Head: Normocephalic and atraumatic.   Neck: Normal range of motion.   Pulmonary/Chest: Effort normal. No respiratory distress.   Abdominal: Soft. He exhibits no distension and no mass. There is no tenderness. No hernia.   Musculoskeletal: Normal range of motion.   Lymphadenopathy:     He has no cervical adenopathy.   Neurological: He is alert and oriented to person, place, and time.   Skin: Skin is warm and dry.   Psychiatric: He has a normal mood and affect. His behavior is normal.   Vitals reviewed.      Labs Recent and today in the office:  Results for orders placed or performed in visit on 02/19/20   POC Urinalysis Dipstick, Automated   Result Value Ref Range    Color Yellow Yellow, Straw, Dark  Yellow, Mamta    Clarity, UA Clear Clear    Specific Gravity  1.020 1.005 - 1.030    pH, Urine 6.5 5.0 - 8.0    Leukocytes Negative Negative    Nitrite, UA Negative Negative    Protein, POC Negative Negative mg/dL    Glucose, UA Negative Negative, 1000 mg/dL (3+) mg/dL    Ketones, UA Negative Negative    Urobilinogen, UA Normal Normal    Bilirubin Negative Negative    Blood, UA Negative Negative         Assessment & Plan  Nocturia: Currently has minimal lower extremity edema so he is reminded to minimize the salt intake and get off of Mountain Dew which he has started drinking again recently.  He should discontinue the Dyazide and try the Ditropan again.  If this is not effective then he would be a candidate for DDAVP.  We will check his electrolytes in order to be contacted if we have a significant problem.

## 2020-07-29 ENCOUNTER — TELEPHONE (OUTPATIENT)
Dept: URGENT CARE | Facility: CLINIC | Age: 60
End: 2020-07-29

## 2020-07-29 LAB
BUN SERPL-MCNC: 22 MG/DL (ref 8–23)
BUN/CREAT SERPL: 19.8 (ref 7–25)
CALCIUM SERPL-MCNC: 9 MG/DL (ref 8.6–10.5)
CHLORIDE SERPL-SCNC: 100 MMOL/L (ref 98–107)
CO2 SERPL-SCNC: 27.6 MMOL/L (ref 22–29)
CREAT SERPL-MCNC: 1.11 MG/DL (ref 0.76–1.27)
GLUCOSE SERPL-MCNC: 132 MG/DL (ref 65–99)
MAGNESIUM SERPL-MCNC: 2.1 MG/DL (ref 1.6–2.4)
POTASSIUM SERPL-SCNC: 3.9 MMOL/L (ref 3.5–5.2)
SODIUM SERPL-SCNC: 137 MMOL/L (ref 136–145)

## 2020-07-29 NOTE — TELEPHONE ENCOUNTER
pt. called and i reported to him that his COVID 19 test negative; patient may end self-quarantine when the following criteria are met:  at least 10 days have passed since symptoms first appeared and fever has been resolved for 72 hours. he verbalized understanding

## 2020-08-19 ENCOUNTER — OFFICE VISIT (OUTPATIENT)
Dept: PULMONOLOGY | Facility: CLINIC | Age: 60
End: 2020-08-19

## 2020-08-19 VITALS
HEART RATE: 71 BPM | SYSTOLIC BLOOD PRESSURE: 142 MMHG | OXYGEN SATURATION: 97 % | HEIGHT: 76 IN | TEMPERATURE: 97.7 F | BODY MASS INDEX: 33.97 KG/M2 | DIASTOLIC BLOOD PRESSURE: 82 MMHG | RESPIRATION RATE: 18 BRPM | WEIGHT: 279 LBS

## 2020-08-19 DIAGNOSIS — G25.81 RESTLESS LEG SYNDROME: Primary | ICD-10-CM

## 2020-08-19 PROCEDURE — 99213 OFFICE O/P EST LOW 20 MIN: CPT | Performed by: INTERNAL MEDICINE

## 2020-08-19 RX ORDER — ROPINIROLE 1 MG/1
1 TABLET, FILM COATED ORAL NIGHTLY
Qty: 30 TABLET | Refills: 11 | Status: SHIPPED | OUTPATIENT
Start: 2020-08-19 | End: 2021-01-28 | Stop reason: SDUPTHER

## 2020-08-19 NOTE — PROGRESS NOTES
"Chief Complaint   Patient presents with   • Follow-up   • Sleeping Problem       Subjective   Nic Napoles is a 59 y.o. male.     History of Present Illness   He is using his Requip at 8:30 PM or so and feels that it has definitely helped his sleep.     No issues anymore.     The following portions of the patient's history were reviewed and updated as appropriate: allergies, current medications, past family history, past medical history, past social history and past surgical history.    Review of Systems   Constitutional: Negative for chills and fever.   HENT: Negative for rhinorrhea, sinus pressure, sneezing and sore throat.    Respiratory: Negative for cough, chest tightness, shortness of breath and wheezing.    Psychiatric/Behavioral: Negative for sleep disturbance.       Objective   Visit Vitals  /82   Pulse 71   Temp 97.7 °F (36.5 °C)   Resp 18   Ht 193 cm (76\")   Wt 127 kg (279 lb)   SpO2 97%   BMI 33.96 kg/m²       Physical Exam   Constitutional: He is oriented to person, place, and time. He appears well-developed and well-nourished.   HENT:   Head: Atraumatic.   Crowded oropharynx.    Musculoskeletal:   Gait was normal.   Neurological: He is alert and oriented to person, place, and time.   Psychiatric: He has a normal mood and affect.   Vitals reviewed.        Assessment/Plan   Nic was seen today for follow-up and sleeping problem.    Diagnoses and all orders for this visit:    Restless leg syndrome    Other orders  -     rOPINIRole (REQUIP) 1 MG tablet; Take 1 tablet by mouth Every Night. Take 1 hour before bedtime.         Return if symptoms worsen or fail to improve.    DISCUSSION (if any):  Patient's last CBC excludes significant iron deficiency causing anemia, as a potential contributor to symptoms of RLS.    We will continue the patient on Requip and adjust the dose according to symptomatic relief.    he was informed about the side effects in great detail.    He will need to follow up " with his PCP from now on and come back if worse.     Dictated utilizing Dragon dictation.    This document was electronically signed by Prabhjot Bean MD on 08/19/20 at 15:11

## 2021-01-28 RX ORDER — ROPINIROLE 1 MG/1
1 TABLET, FILM COATED ORAL NIGHTLY
Qty: 30 TABLET | Refills: 11 | Status: SHIPPED | OUTPATIENT
Start: 2021-01-28 | End: 2022-01-17

## 2021-05-05 ENCOUNTER — OFFICE VISIT (OUTPATIENT)
Dept: PULMONOLOGY | Facility: CLINIC | Age: 61
End: 2021-05-05

## 2021-05-05 VITALS
WEIGHT: 279 LBS | HEIGHT: 76 IN | BODY MASS INDEX: 33.97 KG/M2 | OXYGEN SATURATION: 97 % | SYSTOLIC BLOOD PRESSURE: 134 MMHG | DIASTOLIC BLOOD PRESSURE: 82 MMHG | RESPIRATION RATE: 16 BRPM | HEART RATE: 78 BPM

## 2021-05-05 DIAGNOSIS — G25.81 RESTLESS LEG SYNDROME: Primary | ICD-10-CM

## 2021-05-05 DIAGNOSIS — E66.9 OBESITY (BMI 30-39.9): ICD-10-CM

## 2021-05-05 DIAGNOSIS — R06.83 SNORING: ICD-10-CM

## 2021-05-05 DIAGNOSIS — G47.33 OSA (OBSTRUCTIVE SLEEP APNEA): ICD-10-CM

## 2021-05-05 PROCEDURE — 99213 OFFICE O/P EST LOW 20 MIN: CPT | Performed by: NURSE PRACTITIONER

## 2021-05-05 RX ORDER — TIZANIDINE 4 MG/1
TABLET ORAL
COMMUNITY
Start: 2021-04-23

## 2021-06-14 ENCOUNTER — HOSPITAL ENCOUNTER (OUTPATIENT)
Dept: SLEEP MEDICINE | Facility: HOSPITAL | Age: 61
Discharge: HOME | End: 2021-06-14
Admitting: NURSE PRACTITIONER

## 2021-06-14 DIAGNOSIS — R06.83 SNORING: ICD-10-CM

## 2021-06-14 DIAGNOSIS — G47.33 OSA (OBSTRUCTIVE SLEEP APNEA): ICD-10-CM

## 2021-06-14 PROCEDURE — 95806 SLEEP STUDY UNATT&RESP EFFT: CPT

## 2021-06-14 PROCEDURE — 95806 SLEEP STUDY UNATT&RESP EFFT: CPT | Performed by: INTERNAL MEDICINE

## 2021-06-16 DIAGNOSIS — G47.33 OSA (OBSTRUCTIVE SLEEP APNEA): Primary | ICD-10-CM

## 2021-08-12 ENCOUNTER — OFFICE VISIT (OUTPATIENT)
Dept: PULMONOLOGY | Facility: CLINIC | Age: 61
End: 2021-08-12

## 2021-08-12 VITALS
SYSTOLIC BLOOD PRESSURE: 118 MMHG | RESPIRATION RATE: 16 BRPM | DIASTOLIC BLOOD PRESSURE: 74 MMHG | HEIGHT: 76 IN | OXYGEN SATURATION: 96 % | WEIGHT: 278 LBS | HEART RATE: 66 BPM | BODY MASS INDEX: 33.85 KG/M2

## 2021-08-12 DIAGNOSIS — G25.81 RESTLESS LEG SYNDROME: ICD-10-CM

## 2021-08-12 DIAGNOSIS — G47.33 OSA (OBSTRUCTIVE SLEEP APNEA): Primary | ICD-10-CM

## 2021-08-12 PROCEDURE — 99214 OFFICE O/P EST MOD 30 MIN: CPT | Performed by: INTERNAL MEDICINE

## 2021-08-12 NOTE — PROGRESS NOTES
"  Chief Complaint   Patient presents with   • Follow-up     restless leg syndrome   • Sleeping Problem       Subjective   Nic Napoles is a 60 y.o. male.     History of Present Illness   Patient was evaluated today for follow up of Sleep apnea.     Patient doesn't report any issues with the PAP device. The patient describes no significant issues with his mask either.     Patient says that the compliance with the use of the equipment is good.     Patient says that his symptoms of fatigue & daytime sleepiness have been helped greatly with the use of PAP, as prescribed.     He is on Requip and feels that the restless legs are better controlled.       The following portions of the patient's history were reviewed and updated as appropriate: allergies, current medications, past family history, past medical history, past social history and past surgical history.    Review of Systems   HENT: Negative for sinus pressure, sneezing and sore throat.    Respiratory: Negative for cough, chest tightness, shortness of breath and wheezing.        Objective   Visit Vitals  /74   Pulse 66   Resp 16   Ht 193 cm (75.98\")   Wt 126 kg (278 lb)   SpO2 96%   BMI 33.85 kg/m²       Physical Exam  Vitals reviewed.   Constitutional:       Appearance: He is well-developed.   HENT:      Head: Atraumatic.      Mouth/Throat:      Comments: Oropharynx was crowded.  Musculoskeletal:      Comments: Gait was normal.   Neurological:      Mental Status: He is alert and oriented to person, place, and time.         Assessment/Plan   Diagnoses and all orders for this visit:    1. DANIEL (obstructive sleep apnea) (Primary)    2. Restless leg syndrome           Return in about 1 year (around 8/12/2022) for [facility].    DISCUSSION (if any):  I discussed the results of sleep study with the patient, in detail. I informed him that the apnea hypopnea index was 20 / hr. This was a home study.     I told the patient that he has obstructive sleep " apnea based on the sleep study.  The symptoms also suggest the likelihood of obstructive sleep apnea.    Based on the above, we will encourage the patient to continue AutoPap at empiric pressure of 6/14 cm H2O.     He is on FFM.    I have asked him to call us back, if there are any issues with mask or the device.    Patient was educated on good sleep hygiene measures and voiced understanding of the same.     Patient was counseled regarding compliance with the machine and appropriate care of mask and device was discussed.    Continue Requip for RLS since he is responding to treatment.     I spent a total of 32 minutes face to face with this patient. Part of this time was spent in counseling patient about the pathophysiology of underlying disease process, reviewing any available sleep studies, need to use devices (as applicable) on a regular basis and lifestyle modifications that may positively impact patient's health.  Time also includes documentation in electronic health records.        Dictated utilizing Dragon dictation.    This document was electronically signed by Prabhjot Bean MD on 08/12/21 at 15:11 EDT

## 2022-01-17 RX ORDER — ROPINIROLE 1 MG/1
TABLET, FILM COATED ORAL
Qty: 30 TABLET | Refills: 11 | Status: SHIPPED | OUTPATIENT
Start: 2022-01-17 | End: 2022-07-07

## 2022-07-07 ENCOUNTER — OFFICE VISIT (OUTPATIENT)
Dept: PULMONOLOGY | Facility: CLINIC | Age: 62
End: 2022-07-07

## 2022-07-07 VITALS
HEIGHT: 76 IN | WEIGHT: 272.6 LBS | RESPIRATION RATE: 18 BRPM | HEART RATE: 59 BPM | OXYGEN SATURATION: 96 % | DIASTOLIC BLOOD PRESSURE: 72 MMHG | SYSTOLIC BLOOD PRESSURE: 130 MMHG | BODY MASS INDEX: 33.2 KG/M2

## 2022-07-07 DIAGNOSIS — E66.9 OBESITY (BMI 30-39.9): ICD-10-CM

## 2022-07-07 DIAGNOSIS — G47.33 OSA (OBSTRUCTIVE SLEEP APNEA): Primary | ICD-10-CM

## 2022-07-07 DIAGNOSIS — G25.81 RESTLESS LEG SYNDROME: ICD-10-CM

## 2022-07-07 PROCEDURE — 99212 OFFICE O/P EST SF 10 MIN: CPT | Performed by: NURSE PRACTITIONER

## 2022-07-07 RX ORDER — POTASSIUM CHLORIDE 1500 MG/1
TABLET, FILM COATED, EXTENDED RELEASE ORAL
COMMUNITY
Start: 2022-06-14

## 2022-07-07 RX ORDER — ROPINIROLE 1 MG/1
2 TABLET, FILM COATED ORAL NIGHTLY
Qty: 30 TABLET | Refills: 11 | Status: SHIPPED | OUTPATIENT
Start: 2022-07-07 | End: 2022-07-07

## 2022-07-07 RX ORDER — ROPINIROLE 1 MG/1
2 TABLET, FILM COATED ORAL NIGHTLY
Qty: 60 TABLET | Refills: 11 | Status: SHIPPED | OUTPATIENT
Start: 2022-07-07

## 2022-09-07 PROCEDURE — U0004 COV-19 TEST NON-CDC HGH THRU: HCPCS | Performed by: NURSE PRACTITIONER

## 2022-12-01 ENCOUNTER — HOSPITAL ENCOUNTER (OUTPATIENT)
Dept: GENERAL RADIOLOGY | Facility: HOSPITAL | Age: 62
Discharge: HOME | End: 2022-12-01
Admitting: INTERNAL MEDICINE

## 2022-12-01 ENCOUNTER — TRANSCRIBE ORDERS (OUTPATIENT)
Dept: OTHER | Facility: OTHER | Age: 62
End: 2022-12-01

## 2022-12-01 DIAGNOSIS — M79.672 LEFT FOOT PAIN: ICD-10-CM

## 2022-12-01 DIAGNOSIS — M79.672 LEFT FOOT PAIN: Primary | ICD-10-CM

## 2022-12-01 PROCEDURE — 73630 X-RAY EXAM OF FOOT: CPT

## 2024-02-21 ENCOUNTER — TRANSCRIBE ORDERS (OUTPATIENT)
Dept: ADMINISTRATIVE | Facility: HOSPITAL | Age: 64
End: 2024-02-21
Payer: COMMERCIAL

## 2024-02-21 DIAGNOSIS — N28.1 CYST OF RIGHT KIDNEY: Primary | ICD-10-CM

## 2024-04-12 ENCOUNTER — HOSPITAL ENCOUNTER (OUTPATIENT)
Dept: ULTRASOUND IMAGING | Facility: HOSPITAL | Age: 64
Discharge: HOME | End: 2024-04-12
Admitting: ORTHOPAEDIC SURGERY
Payer: COMMERCIAL

## 2024-04-12 DIAGNOSIS — N28.1 CYST OF RIGHT KIDNEY: ICD-10-CM

## 2024-04-12 PROCEDURE — 76775 US EXAM ABDO BACK WALL LIM: CPT

## 2024-07-02 ENCOUNTER — TRANSCRIBE ORDERS (OUTPATIENT)
Dept: LAB | Facility: HOSPITAL | Age: 64
End: 2024-07-02
Payer: COMMERCIAL

## 2024-07-02 ENCOUNTER — HOSPITAL ENCOUNTER (OUTPATIENT)
Dept: GENERAL RADIOLOGY | Facility: HOSPITAL | Age: 64
Discharge: HOME | End: 2024-07-02
Admitting: INTERNAL MEDICINE
Payer: COMMERCIAL

## 2024-07-02 DIAGNOSIS — M25.519 SHOULDER PAIN, UNSPECIFIED CHRONICITY, UNSPECIFIED LATERALITY: ICD-10-CM

## 2024-07-02 DIAGNOSIS — M25.519 SHOULDER PAIN, UNSPECIFIED CHRONICITY, UNSPECIFIED LATERALITY: Primary | ICD-10-CM

## 2024-07-02 PROCEDURE — 73030 X-RAY EXAM OF SHOULDER: CPT

## 2025-04-03 ENCOUNTER — OFFICE VISIT (OUTPATIENT)
Dept: ORTHOPEDIC SURGERY | Facility: CLINIC | Age: 65
End: 2025-04-03
Payer: COMMERCIAL

## 2025-04-03 VITALS
SYSTOLIC BLOOD PRESSURE: 130 MMHG | BODY MASS INDEX: 32.95 KG/M2 | DIASTOLIC BLOOD PRESSURE: 82 MMHG | HEIGHT: 76 IN | WEIGHT: 270.6 LBS

## 2025-04-03 DIAGNOSIS — G89.29 CHRONIC LEFT SHOULDER PAIN: Primary | ICD-10-CM

## 2025-04-03 DIAGNOSIS — M25.512 CHRONIC LEFT SHOULDER PAIN: Primary | ICD-10-CM

## 2025-04-03 NOTE — PROGRESS NOTES
Office Note     Name: Nic Napoles    : 1960     MRN: [id number]     Chief Complaint  Pain of the Left Shoulder (States he has been having pain and weakness in his arm for about a year, no known injury. He was given a cortisone injection by his PCP about six weeks ago, it did not last long.)    Subjective     History of Present Illness:  Nic Napoles is a 64 y.o. male presenting today for the evaluation of left shoulder pain and weakness ongoing for approximately 1 year.  Patient denies any inciting injury or event, denies previous injury to the affected area.  He complains of pain primarily in the anterior shoulder in the area of the anterior glenohumeral joint line exacerbated by activity such as internal rotation.  He states that he was seen by his PCP approximately 6 weeks ago and was given a subacromial steroid injection which did provide significant relief for a few weeks.  After this some of his pain returned though today he states that his shoulder is significantly improved.  He states the pain is only mild and only with activity such as reaching behind his back.  He denies paresthesias in the left upper extremity.  He notes that he does go to the gym regularly.      Review of Systems   Musculoskeletal:  Positive for arthralgias (left shoulder).        Past Medical History:   Diagnosis Date    Allergic         Past Surgical History:   Procedure Laterality Date    HEEL SPUR SURGERY         Family History   Problem Relation Age of Onset    Cancer Mother     Diabetes Mother        Social History     Socioeconomic History    Marital status:    Tobacco Use    Smoking status: Never    Smokeless tobacco: Never   Substance and Sexual Activity    Alcohol use: No    Drug use: No    Sexual activity: Defer         Current Outpatient Medications:     diclofenac (VOLTAREN) 75 MG EC tablet, , Disp: , Rfl: 0    rOPINIRole (REQUIP) 1 MG tablet, Take 2 tablets by mouth Every Night. Take 1 hour  "before bedtime., Disp: 60 tablet, Rfl: 11    No Known Allergies        Objective   /82   Ht 193 cm (75.98\")   Wt 123 kg (270 lb 9.6 oz)   BMI 32.95 kg/m²    BMI is >= 30 and <35. (Class 1 Obesity). The following options were offered after discussion;: weight loss educational material (shared in after visit summary)       Physical Exam  Left Shoulder Exam     Tenderness   The patient is experiencing no tenderness.     Range of Motion   The patient has normal left shoulder ROM.    Muscle Strength   The patient has normal left shoulder strength.    Tests   Apprehension: negative  Haro test: negative  Cross arm: negative  Impingement: negative  Drop arm: negative    Other   Erythema: absent  Sensation: normal  Pulse: present     Comments:  Mild pain in the anterior shoulder with internal rotation.  Negative liftoff test, negative belly press test.  Negative Calvert's, negative biceps load 1 and 2           Extremity DVT signs are negative by clinical screen.     Independent Review of Radiographic Studies:    I personally reviewed the available, pertinent imaging studies and I agree with the radiologist's interpretation.    XR Shoulder 2+ View Left  Order date: 7/2/2024  Authorizing: He Hinson MD  Ordered by He Hinson MD on 7/2/2024.     Narrative & Impression    3 view extremity PROCEDURE: XR SHOULDER 2+ VW LEFT-     History: SHOULDER PAIN; M25.519-Pain in unspecified shoulder     COMPARISON: None.     FINDINGS:  A 3 view exam demonstrates no acute fracture or dislocation.  There are cystic degenerative changes of the humeral head. There is  narrowing and irregularity of the AC joint. Increased subacromial  distance may be due to bursitis.     IMPRESSION:  No acute fracture. Consider MRI if pain persists.      Images were reviewed, interpreted, and dictated by Dr. Carla Encinas MD  Transcribed by Abida Granger PA-C.     This report was signed and finalized on 7/2/2024 3:27 PM by Carla " MD Huang.       Procedures    Assessment and Plan   Diagnoses and all orders for this visit:    1. Chronic left shoulder pain (Primary)       Discussion of orthopedic goals  Orthopedic activities reviewed and patient expressed appreciation  Risk, benefits, and merits of treatment alternatives reviewed with the patient and questions answered  Patient guided on mobility and conditioning exercises  Ice, heat, and/or modalities as needed and beneficial  Patient was counseled and provided with a HEP.   Reduced physical activity as appropriate and avoid aggravating activities.   Weight bearing parameters reviewed.     Recommendations/Plan:  Exercise, medications, injections, other patient advice, and return appointment as noted.  Patient and/or guardian(s) were encouraged to call or return to the office for any issues or concerns.    DDx:  Partial subscapularis tear, impingement syndrome, labral tear    At this time I suspect the patient is suffering from a small partial tear of the subscapularis tendon.  He may also be having impingement syndrome, perhaps due from scar tissue from the subscapularis.  Labral tear is within the differential though I believe less likely.  Given that his symptoms are mild and only with certain activities I counseled and provided him with a home exercise plan.  I discussed perhaps another injection at the 3-month mike if needed or requested.  Also advised ice heat and over-the-counter analgesics as needed.  I encouraged him to continue with his exercise at the gym and counseled him for this.  I advised follow-up with me as needed concerning his left shoulder.    Return if symptoms worsen or fail to improve.